# Patient Record
Sex: FEMALE | Race: WHITE | Employment: UNEMPLOYED | URBAN - METROPOLITAN AREA
[De-identification: names, ages, dates, MRNs, and addresses within clinical notes are randomized per-mention and may not be internally consistent; named-entity substitution may affect disease eponyms.]

---

## 2023-11-03 ENCOUNTER — OFFICE VISIT (OUTPATIENT)
Age: 23
End: 2023-11-03

## 2023-11-03 VITALS
HEIGHT: 64 IN | SYSTOLIC BLOOD PRESSURE: 109 MMHG | OXYGEN SATURATION: 98 % | DIASTOLIC BLOOD PRESSURE: 75 MMHG | WEIGHT: 194.6 LBS | BODY MASS INDEX: 33.22 KG/M2 | HEART RATE: 103 BPM

## 2023-11-03 DIAGNOSIS — S93.402A SPRAIN OF LEFT ANKLE, UNSPECIFIED LIGAMENT, INITIAL ENCOUNTER: ICD-10-CM

## 2023-11-03 DIAGNOSIS — S99.912A INJURY OF LEFT ANKLE, INITIAL ENCOUNTER: Primary | ICD-10-CM

## 2023-11-03 PROBLEM — M22.40 CHONDROMALACIA OF PATELLA: Status: ACTIVE | Noted: 2019-04-08

## 2023-11-03 PROBLEM — G56.11 PRONATOR SYNDROME OF RIGHT UPPER EXTREMITY: Status: ACTIVE | Noted: 2023-07-19

## 2023-11-03 PROBLEM — M18.11 ARTHRITIS OF CARPOMETACARPAL (CMC) JOINT OF RIGHT THUMB: Status: ACTIVE | Noted: 2022-06-02

## 2023-11-03 PROBLEM — S83.001D: Status: ACTIVE | Noted: 2020-03-04

## 2023-11-03 PROBLEM — F41.1 GAD (GENERALIZED ANXIETY DISORDER): Status: ACTIVE | Noted: 2019-11-02

## 2023-11-03 PROBLEM — N94.6 MENSTRUAL CRAMPS: Status: ACTIVE | Noted: 2019-11-02

## 2023-11-03 PROBLEM — M25.749 METACARPAL BOSS: Status: ACTIVE | Noted: 2022-06-02

## 2023-11-03 PROBLEM — L52 ERYTHEMA NODOSUM: Status: ACTIVE | Noted: 2019-11-02

## 2023-11-03 ASSESSMENT — ENCOUNTER SYMPTOMS
DIARRHEA: 0
ABDOMINAL PAIN: 0
SHORTNESS OF BREATH: 0
CONSTIPATION: 0
CHEST TIGHTNESS: 0
NAUSEA: 0
VOMITING: 0
COUGH: 0

## 2023-11-03 NOTE — PROGRESS NOTES
Quique Grider (:  2000) is a 26 y.o. female,New patient, here for evaluation of the following chief complaint(s): Ankle Pain (Pt in today c/o L ankle pain x4 days. Goes to gym daily and thinks that is the cause. Pain on the outer ankle. )      ASSESSMENT/PLAN:    ICD-10-CM    1. Injury of left ankle, initial encounter  S99.912A XR ANKLE LEFT (MIN 3 VIEWS)      2. Sprain of left ankle, unspecified ligament, initial encounter  A20.560X           Patient presents for left sided ankle pain. ON exam she does have some swelling to dorsal aspect of foot. No rashes. No signs of cellulitis. Xray per radiologist report: no acute fracture or dislocation  Patient was placed in ortho boot  She is advised to wear boot for at least one week. She does have appointment with podiatrist on Thursday. She is given return precautions to the urgent care, and to ED. Suspect ankle sprain    SUBJECTIVE/OBJECTIVE:    History provided by:  Patient   used: No      HPI:   26 y.o. female presents with symptoms of left sided ankle pain ongoing since 4 days. She states that she does exercising regularly, walking on an incline treadmill. She is also lifting weight. She is having aching ankle pain for the last 4 days. She states that pain is aggravated by flexing and extending foot. She also has pain with bearing weight. No previous known fractures or surgeries to the ankle or foot. Vitals:    23 1106   BP: 109/75   Site: Left Upper Arm   Position: Sitting   Cuff Size: Medium Adult   Pulse: (!) 103   SpO2: 98%   Weight: 88.3 kg (194 lb 9.6 oz)   Height: 1.626 m (5' 4\")       Review of Systems   Constitutional:  Negative for fatigue and fever. Respiratory:  Negative for cough, chest tightness and shortness of breath. Cardiovascular:  Negative for chest pain. Gastrointestinal:  Negative for abdominal pain, constipation, diarrhea, nausea and vomiting.    Musculoskeletal:  Positive for gait problem and